# Patient Record
Sex: MALE | Race: WHITE | NOT HISPANIC OR LATINO | Employment: FULL TIME | URBAN - METROPOLITAN AREA
[De-identification: names, ages, dates, MRNs, and addresses within clinical notes are randomized per-mention and may not be internally consistent; named-entity substitution may affect disease eponyms.]

---

## 2023-07-03 ENCOUNTER — OFFICE VISIT (OUTPATIENT)
Dept: PRIMARY CARE | Facility: CLINIC | Age: 53
End: 2023-07-03
Payer: COMMERCIAL

## 2023-07-03 ENCOUNTER — LAB (OUTPATIENT)
Dept: LAB | Facility: LAB | Age: 53
End: 2023-07-03
Payer: COMMERCIAL

## 2023-07-03 VITALS — SYSTOLIC BLOOD PRESSURE: 132 MMHG | DIASTOLIC BLOOD PRESSURE: 77 MMHG

## 2023-07-03 DIAGNOSIS — Z00.00 ROUTINE MEDICAL EXAM: ICD-10-CM

## 2023-07-03 DIAGNOSIS — R22.2 ABDOMINAL WALL LUMP: Primary | ICD-10-CM

## 2023-07-03 LAB
ALANINE AMINOTRANSFERASE (SGPT) (U/L) IN SER/PLAS: 37 U/L (ref 10–52)
ALBUMIN (G/DL) IN SER/PLAS: 4.3 G/DL (ref 3.4–5)
ALKALINE PHOSPHATASE (U/L) IN SER/PLAS: 55 U/L (ref 33–120)
ANION GAP IN SER/PLAS: 13 MMOL/L (ref 10–20)
ASPARTATE AMINOTRANSFERASE (SGOT) (U/L) IN SER/PLAS: 25 U/L (ref 9–39)
BILIRUBIN TOTAL (MG/DL) IN SER/PLAS: 0.4 MG/DL (ref 0–1.2)
CALCIUM (MG/DL) IN SER/PLAS: 9.3 MG/DL (ref 8.6–10.3)
CARBON DIOXIDE, TOTAL (MMOL/L) IN SER/PLAS: 26 MMOL/L (ref 21–32)
CHLORIDE (MMOL/L) IN SER/PLAS: 105 MMOL/L (ref 98–107)
CHOLESTEROL (MG/DL) IN SER/PLAS: 182 MG/DL (ref 0–199)
CHOLESTEROL IN HDL (MG/DL) IN SER/PLAS: 48.8 MG/DL
CHOLESTEROL/HDL RATIO: 3.7
CREATININE (MG/DL) IN SER/PLAS: 1.1 MG/DL (ref 0.5–1.3)
ERYTHROCYTE DISTRIBUTION WIDTH (RATIO) BY AUTOMATED COUNT: 13.2 % (ref 11.5–14.5)
ERYTHROCYTE MEAN CORPUSCULAR HEMOGLOBIN CONCENTRATION (G/DL) BY AUTOMATED: 33.2 G/DL (ref 32–36)
ERYTHROCYTE MEAN CORPUSCULAR VOLUME (FL) BY AUTOMATED COUNT: 88 FL (ref 80–100)
ERYTHROCYTES (10*6/UL) IN BLOOD BY AUTOMATED COUNT: 4.99 X10E12/L (ref 4.5–5.9)
GFR MALE: 81 ML/MIN/1.73M2
GLUCOSE (MG/DL) IN SER/PLAS: 72 MG/DL (ref 74–99)
HEMATOCRIT (%) IN BLOOD BY AUTOMATED COUNT: 43.7 % (ref 41–52)
HEMOGLOBIN (G/DL) IN BLOOD: 14.5 G/DL (ref 13.5–17.5)
LDL: 77 MG/DL (ref 0–99)
LEUKOCYTES (10*3/UL) IN BLOOD BY AUTOMATED COUNT: 9.4 X10E9/L (ref 4.4–11.3)
NON HDL CHOLESTEROL: 133 MG/DL
PLATELETS (10*3/UL) IN BLOOD AUTOMATED COUNT: 294 X10E9/L (ref 150–450)
POTASSIUM (MMOL/L) IN SER/PLAS: 3.8 MMOL/L (ref 3.5–5.3)
PROTEIN TOTAL: 6.6 G/DL (ref 6.4–8.2)
SODIUM (MMOL/L) IN SER/PLAS: 140 MMOL/L (ref 136–145)
THYROTROPIN (MIU/L) IN SER/PLAS BY DETECTION LIMIT <= 0.05 MIU/L: 2.14 MIU/L (ref 0.44–3.98)
TRIGLYCERIDE (MG/DL) IN SER/PLAS: 280 MG/DL (ref 0–149)
UREA NITROGEN (MG/DL) IN SER/PLAS: 15 MG/DL (ref 6–23)
VLDL: 56 MG/DL (ref 0–40)

## 2023-07-03 PROCEDURE — 99203 OFFICE O/P NEW LOW 30 MIN: CPT | Performed by: FAMILY MEDICINE

## 2023-07-03 PROCEDURE — 80061 LIPID PANEL: CPT

## 2023-07-03 PROCEDURE — 1036F TOBACCO NON-USER: CPT | Performed by: FAMILY MEDICINE

## 2023-07-03 PROCEDURE — 36415 COLL VENOUS BLD VENIPUNCTURE: CPT

## 2023-07-03 PROCEDURE — 84443 ASSAY THYROID STIM HORMONE: CPT

## 2023-07-03 PROCEDURE — 85027 COMPLETE CBC AUTOMATED: CPT

## 2023-07-03 PROCEDURE — 80053 COMPREHEN METABOLIC PANEL: CPT

## 2023-07-03 NOTE — PROGRESS NOTES
Subjective   Patient ID: Dylon Barclay is a 52 y.o. male who presents for abd wall lump for 5 yo    HPI   pt has had a lump  at left mid abd wall  for at least 5 years. the lump got bigger  recently. pt felt occasional tenderness at the area.  pt was concerned about cancer and requested evaluation.    Review of Systems    Objective   /77     Physical Exam  A&O, No acute distress. no cervical or axillary lymphadenopathy,  Lungs: CTA b/l, Heart: RRR, Abdomen: soft, non tenderness. Bowel sound: normal. there was a 0.8x1.3 cm subcutaneous mobile limp at left mid abd wall with mild tenderness, Patient walks with a good balance.     Assessment/Plan     Left abd wall lump, getting bigger with tenderness lately, recommend excision. Rtc for cpe. Labs were ordered.

## 2023-07-13 ENCOUNTER — OFFICE VISIT (OUTPATIENT)
Dept: PRIMARY CARE | Facility: CLINIC | Age: 53
End: 2023-07-13
Payer: COMMERCIAL

## 2023-07-13 VITALS — DIASTOLIC BLOOD PRESSURE: 66 MMHG | SYSTOLIC BLOOD PRESSURE: 128 MMHG

## 2023-07-13 DIAGNOSIS — R22.2 ABDOMINAL WALL LUMP: Primary | ICD-10-CM

## 2023-07-13 PROCEDURE — 22902 EXC ABD LES SC < 3 CM: CPT | Performed by: FAMILY MEDICINE

## 2023-07-13 PROCEDURE — 1036F TOBACCO NON-USER: CPT | Performed by: FAMILY MEDICINE

## 2023-07-13 RX ORDER — AMITRIPTYLINE HYDROCHLORIDE 10 MG/1
1 TABLET, FILM COATED ORAL NIGHTLY
COMMUNITY
Start: 2015-05-07

## 2023-07-13 RX ORDER — HYOSCYAMINE SULFATE 0.125 MG
0.12 TABLET ORAL EVERY 4 HOURS PRN
COMMUNITY
Start: 2015-05-07

## 2023-07-13 RX ORDER — HYDROXYZINE HYDROCHLORIDE 10 MG/1
TABLET, FILM COATED ORAL
COMMUNITY
Start: 2022-07-15

## 2023-07-13 RX ORDER — CEPHALEXIN 500 MG/1
500 CAPSULE ORAL 3 TIMES DAILY
Qty: 21 CAPSULE | Refills: 0 | Status: SHIPPED | OUTPATIENT
Start: 2023-07-13 | End: 2023-07-27 | Stop reason: ALTCHOICE

## 2023-07-13 NOTE — PROGRESS NOTES
Pt came in for  left flank lump removal, Under sterile condition and with 5 ml 1% lidocaine anesthesia, the lump was excised   2x2x3  cm and sent to pathology for evaluation. The wound was closed with 4.0 nylon suture and covered with sterile gauze. No water exposure to the wound in the next 24 hours. We will call pt regarding pathology  report.  Keflex 500mg po tid for 7 days for infection prophylaxis. Estimated blood loss: 5cc. Follow up in  14 days for suture removal.

## 2023-07-27 ENCOUNTER — OFFICE VISIT (OUTPATIENT)
Dept: PRIMARY CARE | Facility: CLINIC | Age: 53
End: 2023-07-27
Payer: COMMERCIAL

## 2023-07-27 VITALS
HEART RATE: 76 BPM | BODY MASS INDEX: 31.23 KG/M2 | WEIGHT: 199 LBS | HEIGHT: 67 IN | RESPIRATION RATE: 14 BRPM | SYSTOLIC BLOOD PRESSURE: 125 MMHG | DIASTOLIC BLOOD PRESSURE: 76 MMHG

## 2023-07-27 DIAGNOSIS — Z12.11 COLON CANCER SCREENING: ICD-10-CM

## 2023-07-27 DIAGNOSIS — Z00.00 ROUTINE MEDICAL EXAM: Primary | ICD-10-CM

## 2023-07-27 PROCEDURE — 99396 PREV VISIT EST AGE 40-64: CPT | Performed by: FAMILY MEDICINE

## 2023-07-27 PROCEDURE — 1036F TOBACCO NON-USER: CPT | Performed by: FAMILY MEDICINE

## 2023-07-27 NOTE — PROGRESS NOTES
Subjective   Patient ID: Dylon Barclay is a 52 y.o. male who presents for No chief complaint on file..    HPI     Review of Systems    Objective   There were no vitals taken for this visit.    Physical Exam    Assessment/Plan

## 2023-07-27 NOTE — PROGRESS NOTES
No concerns today. pt has regular dental visits. no vision problems. no hearing loss.   Lifestyle: healthy diet. no weight concerns. Pt exercises regularly.   he does not use tobacco. he denies alcohol abuse.   Reproductive health: the patient is sexually active. no erectile dysfunction. patient is not at high risk for prostate cancer.   Safety elements used: seat belt, safe driving habits and smoke detector.   no passive smoke exposure, safe sexual behavior, no chemical abuse, no domestic violence, no anxiety symptoms, no depression symptoms, safe driving habits, no driving violations, no history of DUI and no tuberculosis exposure.     Review of Systems  Constitutional: no chills, no fever and no night sweats.   Eyes: no blurred vision and no eyesight problems.   ENT: no hearing loss, + tinnitus. no nasal congestion, no nasal discharge, no hoarseness and no sore throat.   Neck: no mass(es) and no swelling.   Cardiovascular: no chest pain, no intermittent leg claudication, no lower extremity edema, no palpitations and no syncope.   Respiratory: no cough, no shortness of breath during exertion, no shortness of breath at rest and no wheezing.   Gastrointestinal: no abdominal pain, no constipation, no blood in stools, + diarrhea, no melena, no nausea, no rectal pain and no vomiting.   Genitourinary: no dysuria, no change in urinary frequency, no genital lesions, no hematuria, no urinary hesitancy, no incontinence, no nocturia, no lump, no sexual dysfunction, no testicular pain and no feelings of urinary urgency.   Musculoskeletal: no arthralgias, no back pain, no localized joint pain, no myalgias and no neck pain. occ muscle twitches.   Integumentary: no new skin lesions, no rashes and no skin wound. skin lumps  Neurological: no confusion, no convulsions, no difficulty walking, no dizziness, no headache, no limb weakness, no memory changes, no numbness, no speech difficulties, no syncope and no tingling.    Psychiatric: no anxiety, no personality change, no depression, no emotional problems, no homicidal thoughts, no anhedonia, no sleep disturbances and no substance use disorders.   Endocrine: no changes in appetite, no deepening of the voice, no polyuria, no feelings of weakness, no heat/cold intolerance, no muscle weakness, no polydipsia, no recent weight gain and no recent weight loss.   Hematologic/Lymphatic: no tendency for easy bleeding, no tendency for easy bruising, no recurrent infections and no swollen glands.     Physical Exam  Constitutional: Alert and in no acute distress. Well developed, well nourished.   Head and Face: Head and face: Normal.  Palpation of the face and sinuses: Normal.    Eyes: Normal external exam. Pupils: PERRLA, EOMI.   Ears, Nose, Mouth, and Throat: External inspection of ears and nose: Normal.  Hearing: Normal.  Nasal mucosa, septum, and turbinates: Normal.  Oropharynx: Normal.    Neck: No neck mass was observed. Supple. Thyroid not enlarged and there were no palpable thyroid nodules.   Cardiovascular: Heart rate and rhythm were normal, normal S1 and S2, no gallops, no murmurs and no pericardial rub. Pedal pulses: Normal. No peripheral edema.   Pulmonary: No respiratory distress. Clear bilateral breath sounds.   Chest: Chest: Normal.    Abdomen: Soft nontender; no abdominal mass palpated. No organomegaly. No hernias.   Musculoskeletal: Gait and station: Normal. No joint swelling seen, normal movements of all extremities. Range of motion: Normal.  Muscle strength/tone: Normal.    Skin: Normal skin color and pigmentation, normal skin turgor, and no rash. +  subcutaneous lump at abd wall.  left abd wall wound healed well  Neurologic: Cranial nerves 2-12 grossly intact. Deep tendon reflexes were 2+ and symmetric. Sensation: Normal. Coordination: Normal.    Psychiatric: Judgment and insight: Intact. Alert and oriented x 3. Recent and remote memory: Normal.  Mood and affect: Normal.    Lymphatic: No cervical lymphadenopathy. Palpation of lymph nodes in axillae: Normal.      Unremarkable PE except obesity. recommend nutritionist eval. Recommend DASH diet and regular exercise.   Advise eye exam by an OD yearly and dental exam every 6 months. will monitor lipid and weight yearly. Recommend sunscreen application if exposed to the sun when UV index is above 2.  Recommend Hep C, hep B, HIV test. recommend   shingle, Tdap, hepB shot.  We will call pt regarding lab results. f/u as scheduled.  Check FIT.  Sutures were removed. Keep area dry and clean

## 2023-10-23 ENCOUNTER — TELEPHONE (OUTPATIENT)
Dept: PRIMARY CARE | Facility: CLINIC | Age: 53
End: 2023-10-23
Payer: COMMERCIAL

## 2023-10-23 NOTE — TELEPHONE ENCOUNTER
Patient would like results of his excision we did they are in [place] now. The phone number is [phone]

## 2023-12-05 ENCOUNTER — TELEPHONE (OUTPATIENT)
Dept: PRIMARY CARE | Facility: CLINIC | Age: 53
End: 2023-12-05
Payer: COMMERCIAL

## 2023-12-05 NOTE — TELEPHONE ENCOUNTER
Pt's wife called and stated she's tried to call and talk to you a couple times. She just wanted to talk to you about pt's surgery and outcome. [place] so you can call her at [phone]

## 2024-01-29 ENCOUNTER — TELEPHONE (OUTPATIENT)
Dept: PRIMARY CARE | Facility: CLINIC | Age: 54
End: 2024-01-29
Payer: COMMERCIAL

## 2024-01-29 NOTE — TELEPHONE ENCOUNTER
Pts wife called in and would like to go over the lab results for pt. Pt is stationed in [place] this is the number there [phone].

## 2024-07-10 ENCOUNTER — TELEMEDICINE (OUTPATIENT)
Dept: PRIMARY CARE | Facility: CLINIC | Age: 54
End: 2024-07-10
Payer: COMMERCIAL

## 2024-07-10 DIAGNOSIS — U07.1 COVID-19: Primary | ICD-10-CM

## 2024-07-10 PROCEDURE — 1036F TOBACCO NON-USER: CPT | Performed by: FAMILY MEDICINE

## 2024-07-10 PROCEDURE — 99213 OFFICE O/P EST LOW 20 MIN: CPT | Performed by: FAMILY MEDICINE

## 2024-07-10 RX ORDER — NIRMATRELVIR AND RITONAVIR 300-100 MG
3 KIT ORAL 2 TIMES DAILY
Qty: 30 TABLET | Refills: 0 | Status: SHIPPED | OUTPATIENT
Start: 2024-07-10 | End: 2024-07-15

## 2024-07-10 NOTE — PROGRESS NOTES
Subjective   Patient ID: Dylon Barclay is a 53 y.o. male who presents for covid    HPI   Patient started to have  cough, fever, chills, shortness of breath,  change of smell, change of taste, HA, dizziness, fatigue, muscle aches, body aches, congestion, runny nose, nausea, vomiting, diarrhea, sore throat 1 day ago. No chest pain. Pt's wife had covid 3 days ago. Rapid home covid test was positive.   Review of Systems    Objective   There were no vitals taken for this visit.    Physical Exam    Assessment/Plan     Covid19. start paxlovid. recommend to wear a mask as often as possible and wash hands frequently. Tylenol prn for fever or aches. increase fluid intake. call office or go ER asap if SatO2 is below 95% or if sob, confusion, dizziness or chest pain occurs

## 2024-08-06 ENCOUNTER — OFFICE VISIT (OUTPATIENT)
Dept: PRIMARY CARE | Facility: CLINIC | Age: 54
End: 2024-08-06
Payer: COMMERCIAL

## 2024-08-06 VITALS — HEART RATE: 70 BPM | DIASTOLIC BLOOD PRESSURE: 76 MMHG | SYSTOLIC BLOOD PRESSURE: 124 MMHG | RESPIRATION RATE: 15 BRPM

## 2024-08-06 DIAGNOSIS — R05.3 PERSISTENT COUGH: ICD-10-CM

## 2024-08-06 DIAGNOSIS — R10.9 LEFT LATERAL ABDOMINAL PAIN: Primary | ICD-10-CM

## 2024-08-06 PROCEDURE — 99214 OFFICE O/P EST MOD 30 MIN: CPT | Performed by: FAMILY MEDICINE

## 2024-08-06 RX ORDER — BENZONATATE 200 MG/1
200 CAPSULE ORAL 3 TIMES DAILY PRN
Qty: 42 CAPSULE | Refills: 0 | Status: SHIPPED | OUTPATIENT
Start: 2024-08-06 | End: 2024-09-05

## 2024-08-06 NOTE — ASSESSMENT & PLAN NOTE
Check labs and abd us. Call office asap or go to ER asap  if symptoms get worse. Will call pt re test results. Increase fluid intake

## 2024-08-06 NOTE — PROGRESS NOTES
Subjective   Patient ID: Dylon Barclay is a 53 y.o. male who presents for abd pain for 2 days     HPI   Patient started to have left side abd pain about 1 week ago. No nausea, abdominal cramping. vomiting. blood  in the stools has been unchanged.  No pus in the stools. No fever or chills.  No HA, dizziness, imbalance. Pt has persistent dry cough with mild sob, after she had  covid about 1 mos ago. No cp, heart palpitation or cough. pt had good  appetite. Patient was able to keep food and fluid down. Patient had normal urination.    Review of Systems    Objective   /76   Pulse 70   Resp 15     Physical Exam  No  distress, well groomed, eyes: PERRLA, No sclera icterus. No sinus tenderness or nasal discharge, neck: supple, no cervical lymphadenopathy, lungs: cta  b/l, no rales, heart: RRR, cap refill was less than 2 secs, no cyanosis, clubbing or LE edema, abd: soft, mild left side abd  tenderness, no guarding or rebound. BS+, Good balance.     Assessment/Plan   Problem List Items Addressed This Visit             ICD-10-CM    Left lateral abdominal pain - Primary R10.9     Check labs and abd us. Call office asap or go to ER asap  if symptoms get worse. Will call pt re test results. Increase fluid intake           Relevant Orders    US abdomen complete    TSH with reflex to Free T4 if abnormal    Comprehensive Metabolic Panel    CBC    Persistent cough R05.3     Likely complication from recent covid. Check cxr and tessalon as dir. Call office if symptoms do not improve in 5 days           Relevant Medications    benzonatate (Tessalon) 200 mg capsule    Other Relevant Orders    XR chest 2 views

## 2024-08-06 NOTE — ASSESSMENT & PLAN NOTE
Likely complication from recent covid. Check cxr and tessalon as dir. Call office if symptoms do not improve in 5 days